# Patient Record
Sex: FEMALE | Race: WHITE | NOT HISPANIC OR LATINO | Employment: FULL TIME | URBAN - METROPOLITAN AREA
[De-identification: names, ages, dates, MRNs, and addresses within clinical notes are randomized per-mention and may not be internally consistent; named-entity substitution may affect disease eponyms.]

---

## 2023-05-02 NOTE — PROGRESS NOTES
Subjective   Patient ID: Kenrick Felix is a 23 y.o. female who presents for discuss help for weight loss.    HPI     Review of Systems    Objective   /70   Temp 36.6 °C (97.8 °F)   Wt 81.6 kg (180 lb)   BMI 32.92 kg/m²     Physical Exam    Assessment/Plan           Fusiform Excision Additional Text (Leave Blank If You Do Not Want): The margin was drawn around the clinically apparent lesion.  A fusiform shape was then drawn on the skin incorporating the lesion and margins.  Incisions were then made along these lines to the appropriate tissue plane and the lesion was extirpated.

## 2023-05-03 ENCOUNTER — OFFICE VISIT (OUTPATIENT)
Dept: PRIMARY CARE | Facility: CLINIC | Age: 23
End: 2023-05-03
Payer: COMMERCIAL

## 2023-05-03 VITALS
SYSTOLIC BLOOD PRESSURE: 110 MMHG | DIASTOLIC BLOOD PRESSURE: 70 MMHG | TEMPERATURE: 97.8 F | WEIGHT: 180 LBS | BODY MASS INDEX: 32.92 KG/M2

## 2023-05-03 DIAGNOSIS — E66.9 OBESITY (BMI 30-39.9): ICD-10-CM

## 2023-05-03 DIAGNOSIS — J40 BRONCHITIS: Primary | ICD-10-CM

## 2023-05-03 PROCEDURE — 99213 OFFICE O/P EST LOW 20 MIN: CPT | Performed by: FAMILY MEDICINE

## 2023-05-03 PROCEDURE — 1036F TOBACCO NON-USER: CPT | Performed by: FAMILY MEDICINE

## 2023-05-03 RX ORDER — AZITHROMYCIN 250 MG/1
TABLET, FILM COATED ORAL
Qty: 6 TABLET | Refills: 0 | Status: SHIPPED | OUTPATIENT
Start: 2023-05-03 | End: 2023-05-08

## 2023-05-03 ASSESSMENT — ENCOUNTER SYMPTOMS: ENDOCRINE COMMENTS: SEE HPI

## 2023-05-03 NOTE — PROGRESS NOTES
Subjective   Patient ID: Kenrick Felix is a 23 y.o. female who presents for discuss help for weight loss.    She is here today because she is interested in trying something for weight reduction.  Last year she took a 3-month course of phentermine and this was successful but she has gained some of the weight back.  I told her that I do not prescribe phentermine anymore because of the time restriction on its use.  We discussed trying Contrave as an alternative    She also has been fighting with a sinus bronchitis infection over the past 5 to 6 days         Review of Systems   HENT:          See HPI   Endocrine:        See HPI       Objective   /70   Temp 36.6 °C (97.8 °F)   Wt 81.6 kg (180 lb)   BMI 32.92 kg/m²     Physical Exam  Cardiovascular:      Rate and Rhythm: Normal rate and regular rhythm.   Pulmonary:      Effort: Pulmonary effort is normal.      Breath sounds: Normal breath sounds.   Abdominal:      General: There is no distension.      Palpations: There is no mass.   Neurological:      General: No focal deficit present.      Mental Status: She is alert and oriented to person, place, and time.   Psychiatric:         Mood and Affect: Mood normal.         Behavior: Behavior normal.         Assessment/Plan   Problem List Items Addressed This Visit       Obesity (BMI 30-39.9)    Relevant Medications    naltrexone-bupropion (Contrave) ER tablet 1 tablet (Start on 5/3/2023  9:00 AM)    Bronchitis - Primary    Relevant Medications    azithromycin (Zithromax) 250 mg tablet

## 2023-05-03 NOTE — PATIENT INSTRUCTIONS
I have given you a prescription for Contrave.  If the cost is prohibitive, contact me and I will refer you to an endocrinologist who has a weight loss program.  For your sinobronchitis I am putting you on a Z-Jonathan.

## 2023-05-04 ENCOUNTER — TELEPHONE (OUTPATIENT)
Dept: PRIMARY CARE | Facility: CLINIC | Age: 23
End: 2023-05-04
Payer: COMMERCIAL

## 2023-05-04 DIAGNOSIS — E66.9 OBESITY (BMI 30-39.9): Primary | ICD-10-CM

## 2023-05-04 NOTE — TELEPHONE ENCOUNTER
Pt left a msg stating that you were going to call in a weight loss med, Contrave, for her.  I see it entered in the chart but not sent to the pharm.  Please advise.

## 2023-05-30 ENCOUNTER — APPOINTMENT (OUTPATIENT)
Dept: PRIMARY CARE | Facility: CLINIC | Age: 23
End: 2023-05-30
Payer: COMMERCIAL

## 2023-07-03 DIAGNOSIS — E66.9 OBESITY (BMI 30-39.9): ICD-10-CM

## 2023-07-03 RX ORDER — NALTREXONE HYDROCHLORIDE AND BUPROPION HYDROCHLORIDE 8; 90 MG/1; MG/1
TABLET, EXTENDED RELEASE ORAL
Qty: 60 TABLET | Refills: 0 | Status: SHIPPED | OUTPATIENT
Start: 2023-07-03